# Patient Record
Sex: MALE | URBAN - METROPOLITAN AREA
[De-identification: names, ages, dates, MRNs, and addresses within clinical notes are randomized per-mention and may not be internally consistent; named-entity substitution may affect disease eponyms.]

---

## 2018-10-08 ENCOUNTER — OFFICE VISIT - HEALTHEAST (OUTPATIENT)
Dept: FAMILY MEDICINE | Facility: CLINIC | Age: 23
End: 2018-10-08

## 2018-10-08 ENCOUNTER — COMMUNICATION - HEALTHEAST (OUTPATIENT)
Dept: TELEHEALTH | Facility: CLINIC | Age: 23
End: 2018-10-08

## 2018-10-08 DIAGNOSIS — M25.562 CHRONIC PAIN OF BOTH KNEES: ICD-10-CM

## 2018-10-08 DIAGNOSIS — N48.89 PENILE PAIN: ICD-10-CM

## 2018-10-08 DIAGNOSIS — Z11.3 SCREENING FOR STD (SEXUALLY TRANSMITTED DISEASE): ICD-10-CM

## 2018-10-08 DIAGNOSIS — M25.561 CHRONIC PAIN OF BOTH KNEES: ICD-10-CM

## 2018-10-08 DIAGNOSIS — M25.50 POLYARTHRALGIA: ICD-10-CM

## 2018-10-08 DIAGNOSIS — Z00.00 ROUTINE GENERAL MEDICAL EXAMINATION AT A HEALTH CARE FACILITY: ICD-10-CM

## 2018-10-08 DIAGNOSIS — R21 RASH: ICD-10-CM

## 2018-10-08 DIAGNOSIS — R51.9 CHRONIC NONINTRACTABLE HEADACHE, UNSPECIFIED HEADACHE TYPE: ICD-10-CM

## 2018-10-08 DIAGNOSIS — G89.29 CHRONIC PAIN OF BOTH KNEES: ICD-10-CM

## 2018-10-08 DIAGNOSIS — G89.29 CHRONIC NONINTRACTABLE HEADACHE, UNSPECIFIED HEADACHE TYPE: ICD-10-CM

## 2018-10-08 LAB
ALBUMIN SERPL-MCNC: 4.4 G/DL (ref 3.5–5)
ALP SERPL-CCNC: 67 U/L (ref 45–120)
ALT SERPL W P-5'-P-CCNC: 37 U/L (ref 0–45)
ANION GAP SERPL CALCULATED.3IONS-SCNC: 12 MMOL/L (ref 5–18)
AST SERPL W P-5'-P-CCNC: 16 U/L (ref 0–40)
BILIRUB SERPL-MCNC: 1.6 MG/DL (ref 0–1)
BUN SERPL-MCNC: 12 MG/DL (ref 8–22)
C REACTIVE PROTEIN LHE: <0.1 MG/DL (ref 0–0.8)
CALCIUM SERPL-MCNC: 9.8 MG/DL (ref 8.5–10.5)
CHLORIDE BLD-SCNC: 106 MMOL/L (ref 98–107)
CO2 SERPL-SCNC: 23 MMOL/L (ref 22–31)
CREAT SERPL-MCNC: 0.78 MG/DL (ref 0.7–1.3)
ERYTHROCYTE [DISTWIDTH] IN BLOOD BY AUTOMATED COUNT: 12.1 % (ref 11–14.5)
ERYTHROCYTE [SEDIMENTATION RATE] IN BLOOD BY WESTERGREN METHOD: 1 MM/HR (ref 0–15)
GFR SERPL CREATININE-BSD FRML MDRD: >60 ML/MIN/1.73M2
GLUCOSE BLD-MCNC: 89 MG/DL (ref 70–125)
HCT VFR BLD AUTO: 49.5 % (ref 40–54)
HGB BLD-MCNC: 16.9 G/DL (ref 14–18)
HIV 1+2 AB+HIV1 P24 AG SERPL QL IA: NEGATIVE
MCH RBC QN AUTO: 30.4 PG (ref 27–34)
MCHC RBC AUTO-ENTMCNC: 34.2 G/DL (ref 32–36)
MCV RBC AUTO: 89 FL (ref 80–100)
PLATELET # BLD AUTO: 235 THOU/UL (ref 140–440)
PMV BLD AUTO: 7.7 FL (ref 7–10)
POTASSIUM BLD-SCNC: 4.2 MMOL/L (ref 3.5–5)
PROT SERPL-MCNC: 7.1 G/DL (ref 6–8)
RBC # BLD AUTO: 5.56 MILL/UL (ref 4.4–6.2)
RHEUMATOID FACT SERPL-ACNC: 32.9 IU/ML (ref 0–30)
SODIUM SERPL-SCNC: 141 MMOL/L (ref 136–145)
TSH SERPL DL<=0.005 MIU/L-ACNC: 1.86 UIU/ML (ref 0.3–5)
WBC: 4.8 THOU/UL (ref 4–11)

## 2018-10-08 ASSESSMENT — MIFFLIN-ST. JEOR: SCORE: 2053.14

## 2018-10-09 LAB
ANA SER QL: 0.4 U
C TRACH DNA SPEC QL PROBE+SIG AMP: NEGATIVE
N GONORRHOEA DNA SPEC QL NAA+PROBE: NEGATIVE
T PALLIDUM AB SER QL: NEGATIVE

## 2018-10-10 LAB
HSV1 IGG SERPL QL IA: NEGATIVE
HSV2 IGG SERPL QL IA: NEGATIVE

## 2018-10-15 ENCOUNTER — AMBULATORY - HEALTHEAST (OUTPATIENT)
Dept: FAMILY MEDICINE | Facility: CLINIC | Age: 23
End: 2018-10-15

## 2018-10-15 DIAGNOSIS — R76.8 ELEVATED RHEUMATOID FACTOR: ICD-10-CM

## 2018-10-16 ENCOUNTER — COMMUNICATION - HEALTHEAST (OUTPATIENT)
Dept: FAMILY MEDICINE | Facility: CLINIC | Age: 23
End: 2018-10-16

## 2018-10-18 ENCOUNTER — RECORDS - HEALTHEAST (OUTPATIENT)
Dept: ADMINISTRATIVE | Facility: OTHER | Age: 23
End: 2018-10-18

## 2018-11-14 ENCOUNTER — OFFICE VISIT - HEALTHEAST (OUTPATIENT)
Dept: RHEUMATOLOGY | Facility: CLINIC | Age: 23
End: 2018-11-14

## 2018-11-14 DIAGNOSIS — R76.8 RHEUMATOID FACTOR POSITIVE: ICD-10-CM

## 2018-11-14 DIAGNOSIS — M25.50 POLYARTHRALGIA: ICD-10-CM

## 2018-11-14 LAB
C REACTIVE PROTEIN LHE: <0.1 MG/DL (ref 0–0.8)
ERYTHROCYTE [SEDIMENTATION RATE] IN BLOOD BY WESTERGREN METHOD: 2 MM/HR (ref 0–15)

## 2018-11-14 ASSESSMENT — MIFFLIN-ST. JEOR: SCORE: 2049.06

## 2018-11-15 LAB
CCP AB SER IA-ACNC: <0.5 U/ML
HBV SURFACE AG SERPL QL IA: NEGATIVE
HCV AB SERPL QL IA: NEGATIVE

## 2018-12-03 ENCOUNTER — RECORDS - HEALTHEAST (OUTPATIENT)
Dept: ADMINISTRATIVE | Facility: OTHER | Age: 23
End: 2018-12-03

## 2018-12-04 ENCOUNTER — AMBULATORY - HEALTHEAST (OUTPATIENT)
Dept: NEUROLOGY | Facility: CLINIC | Age: 23
End: 2018-12-04

## 2018-12-04 ENCOUNTER — HOSPITAL ENCOUNTER (OUTPATIENT)
Dept: MRI IMAGING | Facility: CLINIC | Age: 23
Discharge: HOME | End: 2018-12-04
Attending: PSYCHIATRY & NEUROLOGY

## 2018-12-04 DIAGNOSIS — R41.840 POOR CONCENTRATION: ICD-10-CM

## 2018-12-07 ENCOUNTER — COMMUNICATION - HEALTHEAST (OUTPATIENT)
Dept: NEUROLOGY | Facility: CLINIC | Age: 23
End: 2018-12-07

## 2018-12-31 ENCOUNTER — HOSPITAL ENCOUNTER (OUTPATIENT)
Dept: NEUROLOGY | Facility: CLINIC | Age: 23
Setting detail: THERAPIES SERIES
Discharge: STILL ADMITTED OR ADMINISTRATIVE | End: 2018-12-31
Attending: PSYCHIATRY & NEUROLOGY

## 2018-12-31 DIAGNOSIS — R41.840 POOR CONCENTRATION: ICD-10-CM

## 2018-12-31 DIAGNOSIS — F12.11 CANNABIS USE DISORDER, MILD, IN EARLY REMISSION: ICD-10-CM

## 2018-12-31 DIAGNOSIS — F10.11 ALCOHOL USE DISORDER, MILD, IN EARLY REMISSION: ICD-10-CM

## 2018-12-31 DIAGNOSIS — F90.2 ADHD (ATTENTION DEFICIT HYPERACTIVITY DISORDER), COMBINED TYPE: ICD-10-CM

## 2019-02-14 ENCOUNTER — OFFICE VISIT - HEALTHEAST (OUTPATIENT)
Dept: RHEUMATOLOGY | Facility: CLINIC | Age: 24
End: 2019-02-14

## 2019-02-14 DIAGNOSIS — M25.562 ARTHRALGIA OF BOTH KNEES: ICD-10-CM

## 2019-02-14 DIAGNOSIS — M25.561 ARTHRALGIA OF BOTH KNEES: ICD-10-CM

## 2019-02-14 DIAGNOSIS — R76.8 RHEUMATOID FACTOR POSITIVE: ICD-10-CM

## 2021-06-02 VITALS — BODY MASS INDEX: 33.2 KG/M2 | WEIGHT: 231.9 LBS | HEIGHT: 70 IN

## 2021-06-02 VITALS — BODY MASS INDEX: 33.07 KG/M2 | WEIGHT: 231 LBS | HEIGHT: 70 IN

## 2021-06-02 VITALS — WEIGHT: 231 LBS | BODY MASS INDEX: 33.15 KG/M2

## 2021-06-16 PROBLEM — M25.569 ARTHRALGIA OF KNEE: Status: ACTIVE | Noted: 2019-02-14

## 2021-06-16 PROBLEM — R76.8 RHEUMATOID FACTOR POSITIVE: Status: ACTIVE | Noted: 2019-02-14

## 2021-06-20 NOTE — PROGRESS NOTES
Assessment and Plan:     1. Routine general medical examination at a health care facility  Discussed consuming a healthy diet and exercising.  Discussed importance of routine sunscreen.  - Influenza, Seasonal,Quad Inj, 36+ MOS    2. Screening for STD (sexually transmitted disease)  Discussed safe sex practices.  Will notify patient of results.  - Chlamydia trachomatis & Neisseria gonorrhoeae, Amplified Detection  - HIV Antigen/Antibody Screening [place]  - Syphilis Screen, [place]  - Herpes simplex Virus (Type 1 and 2) IgG Antibody    3. Penile pain  Patient refused examination today.  Will refer to urology due to chronic pain.  - Ambulatory referral to Urology    4. Chronic nonintractable headache, unspecified headache type  Patient has a history of multiple brain injuries and concussions.  Differentials for headache include migraine, tension, cluster, allergies.  He declines medication today.  Will refer to neurology.  - Ambulatory referral to Neurology    5. Rash  Differentials include Raynaud's and erythromelalgia.  Will rule out autoimmune and hematologic diseases.   - HM2(CBC w/o Differential)  - Comprehensive Metabolic Panel  - Sedimentation Rate  - C-Reactive Protein  - Thyroid [place]  - Antinuclear Antibody (ARTURO) [place]  - Rheumatoid Factor Quant    6. Polyarthralgia  We will rule out lupus, rheumatoid arthritis, and other autoimmune diseases.  May consider referral to rheumatology.  - Sedimentation Rate  - C-Reactive Protein  - Antinuclear Antibody (ARTURO) [place]  - Rheumatoid Factor Quant    7. Chronic pain of both knees  Discussed patellofemoral syndrome.  Patient may have difficulty tracking the patella.  Discussed symptomatic treatment including rest, ice, NSAIDs.  Will wait for autoimmune labs.  If normal, will refer to orthopedics.  He is content with the plan.        Subjective:     Hiram is a 23 y.o. male presenting to the clinic for a male physical and other concerns today.  Patient is single  and is sexually active.  He does not have any children.  He does have concerns for STDs.  Patient states he is uncircumcised and has had difficulty retracting the skin of his penis for multiple years.  He saw a provider for this 3 years ago where the provider suspected a yeast infection.  Patient has intermittent sores around his penis.  He denies symptoms today but he would like STD screening.  Patient is currently in an outpatient treatment at [facility] for alcohol, marijuana, cocaine, psychedelic use.  He did relapse 1 week ago.  He is interested in seeing a neurologist due to repeated head injuries in the past.  He has a history of being hit in the head with a baseball resulting in loss of conscious when he was younger.  He also sustained an injury where he he was unconscious after falling into a fountain pool.  Patient feels as though he does not think of consequences before he acts.  He has had headaches for numerous years occurring 2-3 times per week.  States the headache is primarily posteriorly and affects his neck.  Occasionally has pain behind his eyes.  He denies nausea, vomiting, numbness and tingling of his extremities, muscular weakness.  He is also concerned of polyarthralgias.  He has pain in both knees that have persisted since middle school.  He feels a sharp pain under the patella within his left knee with ambulating.  He feels as though in the knee locks up and he will have a snapping sensation.  He denies any swelling.  He also complains of pain within his sternum.  He feels pressure in his lungs and states that he will crack his ribs and this will provide relief.  He hears a snap.  Since early high school, he has developed a rash that occurs on his palms.  It is painful and he feels as though his hands are over eating heating.  His hands will look red and he feels the need to put his hands in a freezer.  The redness will last an hour.  This occurs at least once per month. This happens  "year-round.     Review of Systems: A complete 14 point review of systems was obtained and is negative or as stated in the history of present illness.    Social History     Social History     Marital status: Single     Spouse name: N/A     Number of children: N/A     Years of education: N/A     Occupational History     Not on file.     Social History Main Topics     Smoking status: Current Every Day Smoker     Smokeless tobacco: Never Used     Alcohol use Not on file     Drug use: Not on file     Sexual activity: Not on file     Other Topics Concern     Not on file     Social History Narrative     No narrative on file       Active Ambulatory Problems     Diagnosis Date Noted     No Active Ambulatory Problems     Resolved Ambulatory Problems     Diagnosis Date Noted     No Resolved Ambulatory Problems     No Additional Past Medical History       No family history on file.    Objective:     /80  Pulse 86  Ht 5' 10\" (1.778 m)  Wt (!) 231 lb 14.4 oz (105.2 kg)  SpO2 98%  BMI 33.27 kg/m2    General Appearance:    Alert, cooperative, no distress, appears stated age   Head:    Normocephalic, without obvious abnormality, atraumatic   Eyes:    PERRL, conjunctiva/corneas clear, EOM's intact, fundi     benign, both eyes        Ears:    Normal TM's and external ear canals, both ears   Nose:   Nares normal, septum midline, mucosa normal, no drainage    or sinus tenderness   Throat:   Lips, mucosa, and tongue normal; teeth and gums normal   Neck:   Supple, symmetrical, trachea midline, no adenopathy;        thyroid:  No enlargement/tenderness/nodules; no carotid    bruit or JVD   Back:     Symmetric, no curvature, ROM normal, no CVA tenderness   Lungs:     Clear to auscultation bilaterally, respirations unlabored   Chest wall:    No tenderness or deformity   Heart:    Regular rate and rhythm, S1 and S2 normal, no murmur, rub    or gallop   Abdomen:     Soft, non-tender, bowel sounds active all four quadrants,     no " masses, no organomegaly   Genitalia:    Deferred per patient        Extremities:   Extremities normal, atraumatic, no cyanosis or edema.  He has full ROM of both knees.  Mild crepitus palpated upon extension of the left knee.     Pulses:   2+ and symmetric all extremities   Skin:   Skin color, texture, turgor normal, no rashes or lesions   Lymph nodes:   Cervical, supraclavicular, and axillary nodes normal   Neurologic:   CNII-XII intact. Normal strength, sensation and reflexes       throughout

## 2021-06-21 NOTE — PROGRESS NOTES
ASSESSMENT AND PLAN:  Hiram Deras 23 y.o. male is seen here on 11/14/18 for evaluation of joint pains predominantly in his knees.  He has a weakly positive rheumatoid factor.  His family history is unknown for various associated risk factors such as psoriasis inflammatory bowel disease or history of connective tissue disease such as rheumatoid arthritis or lupus..  The likelihood that he has an inflammatory arthropathy or connective tissue disease is the key question however appears to be remote.  Workup as noted.  Will meet here in 3 months or sooner.  Diagnoses and all orders for this visit:    Polyarthralgia  -     CCP Antibodies  -     Hepatitis C Antibody (Anti-HCV)  -     C-Reactive Protein  -     Erythrocyte Sedimentation Rate  -     Hepatitis B Surface Antigen (HBsAG)    Rheumatoid factor positive  -     CCP Antibodies  -     Hepatitis C Antibody (Anti-HCV)  -     C-Reactive Protein  -     Erythrocyte Sedimentation Rate  -     Hepatitis B Surface Antigen (HBsAG)      HISTORY OF PRESENTING ILLNESS:  Hiram Deras, 23 y.o., male is here for evaluation of painful joints..  Predominantly in the knees.  Bilateral.  When he kneels or stands from sitting position the pain is significant.  He has not had swelling.  He has taken over-the-counter measures with variable success.  He is not woken up from sleep.  There is no sustained stiffness in the morning.  He has had neck and back pain.  This is more troublesome the more he stands, leans.  He does not have pain at nighttime first thing in the morning.  There is no radiation down the leg.  During his recent workup he had a positive rheumatoid factor.  He has not observed any swelling as well as the joints of the hands wrists his morning stiffness overall is no more than 10 minutes.  He does not have a family history of 0 as well as he is aware he does not have a history of psoriasis.  He is undergoing outpatient treatment for alcoholism.  He quit smoking 3  "weeks ago.  He has had tonsillectomy and wisdom tooth removal.  He is not aware of the health status of his family.  This is in part because I do not go to the doctors as he puts it.  He himself has a history of asthma hypertension and \"bad headaches, \"nervous breakdown\".   Further historical information and ADL limitations as noted in the multidimensional health assessment questionnaire attached in the EMR. Rest of the 13 system ROS is negative.     ALLERGIES:Patient has no known allergies.    PAST MEDICAL/ACTIVE PROBLEMS/MEDICATION/ FAMILY HISTORY/SOCIAL DATA:  The patient has a family history of  Past Medical History:   Diagnosis Date     Anxiety      Asthma      Depression      Social History     Tobacco Use   Smoking Status Former Smoker     Last attempt to quit: 10/24/2018     Years since quittin.0   Smokeless Tobacco Never Used   Tobacco Comment    one cig/day      There is no problem list on file for this patient.    No current outpatient medications on file.     No current facility-administered medications for this visit.        COMPREHENSIVE EXAMINATION:  Vitals:    18 1203   BP: 120/80   Patient Site: Right Arm   Patient Position: Sitting   Cuff Size: Adult Regular   Pulse: 84   Weight: (!) 231 lb (104.8 kg)   Height: 5' 10\" (1.778 m)     A well appearing alert oriented male. Vital data as noted above. His eyes without inflammation/scleromalacia. ENTwithout oral mucositis, thrush, nasal deformity, external ear redness, deformity. His neck is without lymphadenopathy and supple. Lungs normal sounds, no pleural rub. Heart auscultation normal rate, rhythm; no pericardial rub and murmurs. Abdomen soft, non tender, no organomegaly. Skin examined for heliotrope, malar area eruption, lupus pernio, periungual erythema, sclerodactyly, papules, erythema nodosum, purpura, nail pitting, onycholysis, and obvious psoriasis lesion. Neurological examination shows normal alertness, speech, facial symmetry, tone " and power in upper and lower extremities, Tinel's and Phalen's at wrist and gait. The joint examination is performed for swelling, tenderness, warmth, erythema, and range of motion in the following joints: DIPs, PIPs, MCPs, wrists, first CMC's, elbows, shoulders, hips, knees, ankles, feet; spine for range of motion and paraspinal muscles for tenderness. The salient normal / abnormal findings are appended.  He does not have synovitis in any of the palpable joints of upper and lower extremities.  Examination of the knees with ultrasound done today.  There is no effusion in either side.  Flexion is normal at the spine, lumbar.  He does not have dactylitis, enthesitis such as at his Achilles, patellar, epicondylar areas.    LAB / IMAGING DATA:  ALT   Date Value Ref Range Status   10/08/2018 37 0 - 45 U/L Final     Albumin   Date Value Ref Range Status   10/08/2018 4.4 3.5 - 5.0 g/dL Final     Creatinine   Date Value Ref Range Status   10/08/2018 0.78 0.70 - 1.30 mg/dL Final       WBC   Date Value Ref Range Status   10/08/2018 4.8 4.0 - 11.0 thou/uL Final     Hemoglobin   Date Value Ref Range Status   10/08/2018 16.9 14.0 - 18.0 g/dL Final     Platelets   Date Value Ref Range Status   10/08/2018 235 140 - 440 thou/uL Final       Lab Results   Component Value Date    RF 32.9 (H) 10/08/2018    SEDRATE 1 10/08/2018

## 2021-06-22 NOTE — PROGRESS NOTES
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 noted that he did fail math multiple times and he was required to attend summer school.    Both Mr. Deras and his mother both stated that he also had difficulties with attention from a young age.  He indicated that he always had trouble staying focused in school and would frequently \"zone out,\" and had trouble staying on task.  His mother noted that she would often be called in, she remembers this especially in middle school, to discuss with multiple teachers their concerned about him staying on task and being unable to focus.  She noted that they tried many different types of organizational tools such as calendars and organizers to help keep him organized but it was not very successful.  She described how he had difficulties with things such as completing his homework but then not remembering where it was.  She noted that sometimes it would be in his locker but he thought it was at home or vice versa.  Mr. Deras himself noted that he also would have trouble remembering to bring his school supplies.  His mother stated that he also had difficulties with attention and focus at home and seemed to have more difficulties then his siblings.  (He is the middle child of 3 siblings). He has never been diagnosed " with ADHD.     Mr. 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  He works 1 day at each.  He noted that mostly he is working to stay active but he states that he makes minimal money and his focus right now is on attending treatment and staying sober.    Mr. Baptiste stated that he has never been  and he has no children.  He currently lives with his grandmother in [place].  He stated that he does frequently travel to [place] where his mother still lives in [place].    MENTAL STATUS AND BEHAVIORAL OBSERVATIONS:   Mr. Deras arrived on time and accompanied by his mother to today's appointment. He was appropriately dressed and groomed. He was alert and oriented to person, place and date. Gait was unremarkable. His mood was euthmyic and his affect was appropriately reactive. Rapport was easily established and eye contact was unremarkable. He was pleasant and cooperative. Rate, prosody, and content of speech were grossly normal although speech rate was rapid at times. He was often fidgety during the interview and would sometimes fidget with his clothes (pulling his sleeve over his hands or pulling the neck of his shirt over his chin). No significant word finding difficulties or paraphasic errors were evident. There was no evidence of a jeferson thought disorder; no hallucinations or delusions were apparent. Judgment and insight appeared fair.       Mr. Deras appeared adequately motivated and engaged easily in the testing component of the evaluation. His performance was fully intact on embedded measures of objective effort. He  attempted all tasks presented to him and worked at a steady pace.  He did not appear overly frustrated by difficult or challenging tasks and responded appropriately to encouragement from the examiner.  He did fidget frequently during testing and was easily distractible often requiring redirection to the task at hand.  Instructions often required repetition or rephrasing as he would state that he did not remember what he was supposed to do.  Often times stimuli had to be repeated as well.  He did persist with testing and would always ask for clarification if he did not understand what was being asked of him.  No significant barriers to testing were observed and the following is judged to be a valid representation of Mr. Deras's current cognitive strengths and weaknesses.    TESTS ADMINISTERED:   Booth Anxiety Inventory (KARYN), Booth Depression Inventory-II (BDI-II), Brief Visuospatial Memory Test - R Form 1 (BVMT-R), [place] Verbal Learning Test - II (CVLT-II), Continuous Performance Test - 3 (CPT-3), DKEFS Color Word Interference, DKEFS [place] Making Test, DKEFS [place], DKEFS Verbal Fluency, Grooved Pegboard (GP), Wechsler Adult Intelligence Scale--Fourth Edition (WAIS-IV), WRAT-4 Word Reading, and WMS-III Information and Orientation.    [facility] norms were used for GP    DESCRIPTIVE PERFORMANCE KEY:  Scores at the 10th percentile and above are generally considered within normal limits:  Superior scores:  91st percentile and above  High Average scores:       75th through 90th percentile  Average scores:                 25th through 74th percentile  Low Average scores:         10th through 24th percentile    Scores that fall at the 9th percentile are considered borderline    Scores that fall at the 8th percentile and below are considered a degree of impairment:  Mildly Impaired:  3rd through 8th percentile  Moderately Impaired:  1st through 2nd percentile  Severely Impaired:  <1st percentile    OPTIMAL PREMORBID  INTELLECT:  Optimal premorbid intellectual abilities were estimated as falling in the average range based on Mr. Deras's educational and occupational histories and performance on tasks (GAI = 98) least likely to be affected by acquired brain dysfunction (i.e.,  hold tests ).    SUMMARY OF TEST RESULTS:   Mental status exam was average for his age. He was oriented to person, place and date and was able to correctly name the current and previous presidents.     Overall working memory performance was assessed in the low average range (W MI = 86).  Specifically, performance on a measure of basic attention and working memory was assessed in the average range. This reflected low average basic attention skills (LDF = 5) and average working memory skills (LDB = 5, LDS = 7).  On a mental arithmetic task, working memory was assessed in the borderline impaired range. 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     Overall processing speed was measured in the average range (PSI = 102).  Specifically, he performed in the high average range on a symbol search task and the average range on a digit symbol substitution task.  On different measures of speeded processing, he performed in the high average range on a visual scanning task, a number sequencing task, and a speeded word reading task, the average range on a motor speed task and a speeded word reading task, and the low average range on a speeded color naming task.    On a measure of sustained attention, his performance was notable for elevated scores on measures of  impulsivity (commission errors), difficulty distinguishing target from non-targets, variability in his reaction time and substantial reduction in his reaction time as the task progressed.    Performance on language measures was notable for average verbal abstraction skills, average expressive vocabulary skills, and average fund of general knowledge (VCI =  95).  Sight word reading skills were also measured in the average range. Phonemic fluency, inc ontrast, was assessed in the superior range, as was semantic fluency.    Visual spatial reasoning skills were assessed in the high average range for a block construction task, the average range for a pattern completion task, and the average range for a visual puzzles task (JUSTIN = 102).     On a list learning task, overall learning for a 16 item word list was assessed in the high average range (5,11,12,14,15). After a brief delay, he was able to retain 14 words for high average immediate recall performance. After a longer 20 minute delay, he was able to retain 13 words for average delayed free recall performance. Good benefit was obtained from recognition cues as recognition memory was assessed in the high average range. He correctly identified all 16 words, and made 0 false positive errors. Immediate nonverbal memory for a display of 6 geometric figures was assessed in the average range (7,12,12). After a delay, he retained 10 details for average delayed recall performance. Recognition memory was within normal limits for his age as he identified all 6 of the original figures and made 0 false positive errors.    On executive measures, a complex sequencing and set shifting task was performed in the average range and without error. Semantic set shifting task was performed in the low average range. (This score is particularly striking in light of his superior performance on the semantic fluency portion of the task). On a measure of planning and problem-solving, overall performance was assessed in the average range in terms of total achievement.  His performance on this task was assessed in the average to high average range in terms of accuracy and overall time spent (time per move and 1st move time). His ability to inhibit a dominant response was assessed in the high average range. His performance on this task was notable for  an average number of errors (2).  On the more difficult portion of the task in which he was required to alternate between giving a dominant and a nondominant response, he performed in the average range.  He made only one error on this portion of the task.    Fine motor dexterity was low average bilaterally and with the typical dominant hand advantage.     Mild symptoms of both depression and anxiety were endorsed on self-report questionnaires.    EVALUATION SERVICES AND TIME:   Pertinent information was obtained by reviewing the electronic medical record as well as through an individual interview I conducted with the patient.  I selected the tests and supervised the examiner.    Diagnosis:  ADHD-combined type  Alcohol Use Disorder-early remission (by history)  Cannabis Use Disorder-early remission (by history)    For diagnostic and coding purposes, Mr. Deras has a history of  polysubstance abuse (3 months sobriety), and history of multiple concussions and was referred for an evaluation of Mild Neurocognitive Disorder due to Multiple Etiologies. Feedback of results will be provided via a formal feedback appointment with me. He indicated that he would call to schedule.     76440: 1 hour of the neuropsychologists time was spent in neurobehavioral status exam  54545: 3 hours of the neuropsychologists time was spent in medical record review, administration of WRAT-4 Reading and WMS-III Information and Orientation, interpretation and integration of all tests, and report preparation  52587: 3 hours of time was spent in the administration of the remainder of the testing battery by a trained examiner and interpreted by the neuropsychologist      Loretta Avila, PhD, LP, [facility]  Clinical Neuropsychologist, [id number]  Board Certified in Clinical [place] [address]  Phone:  [phone]

## 2021-06-22 NOTE — PROGRESS NOTES
The patient was seen for a neuropsychological evaluation for the purposes of diagnostic clarification and treatment planning. 180 minutes of face-to-face testing were provided by this writer. The patient was cooperative with testing. No concerns were brought to my attention. Please see Dr. Avila's report for a detailed description of the charges and interpretation and integration of the findings.

## 2021-06-24 NOTE — PROGRESS NOTES
ASSESSMENT AND PLAN:  Hiram Deras 23 y.o. male is seen here on 02/14/19 for evaluation of joint pains predominantly in his knees.  He has a weakly positive rheumatoid factor.  His family history is unknown for various associated risk factors such as psoriasis inflammatory bowel disease or history of connective tissue disease such as rheumatoid arthritis or lupus..  The likelihood that he has an inflammatory arthropathy or connective tissue disease is the key question however appears to be remote.  Workup as noted.  Will meet here in 3 months or sooner.  Diagnoses and all orders for this visit:    Arthralgia of both knees    Rheumatoid factor positive      HISTORY OF PRESENTING ILLNESS:  Hiram Deras, 23 y.o., male is here for follow-up of painful joints especially the knees.  He noted significant improvement compared with the past visit here.  There is no swelling then or now.  Other joints are not affected, mild discomfort in the neck and back.  No features to suggest an inflammatory spine symptoms.  Meanwhile he has had further workup.  This is negative for anti-CCP antibody hep C serology sed rate and CRP were normal.  On his previous visit he had noted that the pain was predominantly in the knees.  Bilateral.  When he kneels or stands from sitting position the pain is significant.  He has not had swelling.  He has taken over-the-counter measures with variable success.  He is not woken up from sleep.  There is no sustained stiffness in the morning.  He has had neck and back pain.  This is more troublesome the more he stands, leans.  He does not have pain at nighttime first thing in the morning.  There is no radiation down the leg.  During his recent workup he had a positive rheumatoid factor.  He has not observed any swelling as well as the joints of the hands wrists his morning stiffness overall is no more than 10 minutes.  He does not have a family history of 0 as well as he is aware he does not have a  "history of psoriasis.  He is undergoing outpatient treatment for alcoholism.  He quit smoking 3 weeks ago.  He has had tonsillectomy and wisdom tooth removal.  He is not aware of the health status of his family.  This is in part because I do not go to the doctors as he puts it.  He himself has a history of asthma hypertension and \"bad headaches, \"nervous breakdown\".   Further historical information and ADL limitations as noted in the multidimensional health assessment questionnaire attached in the EMR.  ALLERGIES:Patient has no known allergies.    PAST MEDICAL/ACTIVE PROBLEMS/MEDICATION/ FAMILY HISTORY/SOCIAL DATA:  The patient has a family history of  Past Medical History:   Diagnosis Date     Anxiety      Asthma      Depression      Social History     Tobacco Use   Smoking Status Former Smoker     Last attempt to quit: 10/24/2018     Years since quittin.3   Smokeless Tobacco Never Used   Tobacco Comment    one cig/day      There is no problem list on file for this patient.    No current outpatient medications on file.     No current facility-administered medications for this visit.      DETAILED EXAMINATION  19  :  Vitals:    19 1517   BP: 116/72   Patient Site: Right Arm   Patient Position: Sitting   Cuff Size: Adult Large   Pulse: 76   Weight: (!) 231 lb (104.8 kg)     Alert oriented. Head including the face is examined for malar rash, heliotropes, scarring, lupus pernio. Eyes examined for redness such as in episcleritis/scleritis, periorbital lesions.   Neck/ Face examined for parotid gland swelling, range of motion of neck.  Left upper and lower and right upper and lower extremities examined for tenderness, swelling, warmth of the appendicular joints, range of motion, edema, rash.  Some of the important findings included: He does not have synovitis in any palpable joints such as of the upper extremities, full range of motion of the shoulders, knees without effusion warmth JLT.    LAB / IMAGING " DATA:  ALT   Date Value Ref Range Status   10/08/2018 37 0 - 45 U/L Final     Albumin   Date Value Ref Range Status   10/08/2018 4.4 3.5 - 5.0 g/dL Final     Creatinine   Date Value Ref Range Status   10/08/2018 0.78 0.70 - 1.30 mg/dL Final       WBC   Date Value Ref Range Status   10/08/2018 4.8 4.0 - 11.0 thou/uL Final     Hemoglobin   Date Value Ref Range Status   10/08/2018 16.9 14.0 - 18.0 g/dL Final     Platelets   Date Value Ref Range Status   10/08/2018 235 140 - 440 thou/uL Final       Lab Results   Component Value Date    RF 32.9 (H) 10/08/2018    SEDRATE 2 11/14/2018